# Patient Record
Sex: FEMALE | Race: WHITE | NOT HISPANIC OR LATINO | Employment: OTHER | URBAN - NONMETROPOLITAN AREA
[De-identification: names, ages, dates, MRNs, and addresses within clinical notes are randomized per-mention and may not be internally consistent; named-entity substitution may affect disease eponyms.]

---

## 2024-07-10 ENCOUNTER — TELEPHONE (OUTPATIENT)
Dept: CARDIOLOGY | Facility: CLINIC | Age: 89
End: 2024-07-10

## 2024-07-10 NOTE — TELEPHONE ENCOUNTER
Caller: Roopa Chavez    Relationship to patient: Self    Best call back number: [phone]    Chief complaint: SCHEDULED APPT IS OUTSIDE NEW PATIENT SCHEDULED TIME FRAME    Type of visit: NEW PATIENT    Requested date: ASAP     If rescheduling, when is the original appointment: 9.30.24     Additional notes:PLEASE ADVISE AND CALL.

## 2024-08-28 ENCOUNTER — OFFICE VISIT (OUTPATIENT)
Dept: CARDIOLOGY | Facility: CLINIC | Age: 89
End: 2024-08-28
Payer: MEDICARE

## 2024-08-28 VITALS
OXYGEN SATURATION: 97 % | DIASTOLIC BLOOD PRESSURE: 64 MMHG | HEART RATE: 79 BPM | BODY MASS INDEX: 18.11 KG/M2 | WEIGHT: 102.2 LBS | HEIGHT: 63 IN | SYSTOLIC BLOOD PRESSURE: 130 MMHG

## 2024-08-28 DIAGNOSIS — E78.2 MIXED HYPERLIPIDEMIA: ICD-10-CM

## 2024-08-28 DIAGNOSIS — R09.89 BRUIT OF RIGHT CAROTID ARTERY: Primary | ICD-10-CM

## 2024-08-28 PROBLEM — E55.9 VITAMIN D DEFICIENCY: Status: ACTIVE | Noted: 2021-04-01

## 2024-08-28 PROBLEM — E53.8 FOLIC ACID DEFICIENCY: Status: ACTIVE | Noted: 2024-06-20

## 2024-08-28 PROBLEM — N81.9 PROLAPSE OF FEMALE GENITAL ORGANS: Status: ACTIVE | Noted: 2020-02-04

## 2024-08-28 PROBLEM — E78.5 HYPERLIPIDEMIA: Status: ACTIVE | Noted: 2019-04-18

## 2024-08-28 PROBLEM — R06.09 DYSPNEA ON EXERTION: Status: ACTIVE | Noted: 2024-06-20

## 2024-08-28 PROBLEM — I10 ESSENTIAL HYPERTENSION: Status: ACTIVE | Noted: 2023-02-28

## 2024-08-28 PROCEDURE — 99204 OFFICE O/P NEW MOD 45 MIN: CPT | Performed by: INTERNAL MEDICINE

## 2024-08-28 RX ORDER — FLUTICASONE PROPIONATE 50 MCG
2 SPRAY, SUSPENSION (ML) NASAL DAILY
COMMUNITY

## 2024-08-28 RX ORDER — FEXOFENADINE HCL 180 MG/1
180 TABLET ORAL DAILY
COMMUNITY

## 2024-08-28 RX ORDER — LEVALBUTEROL TARTRATE 45 UG/1
AEROSOL, METERED ORAL
COMMUNITY

## 2024-08-28 RX ORDER — CONJUGATED ESTROGENS 0.62 MG/G
CREAM VAGINAL
COMMUNITY

## 2024-08-28 RX ORDER — AMLODIPINE BESYLATE 5 MG/1
7.5 TABLET ORAL DAILY
COMMUNITY

## 2024-08-28 RX ORDER — DIPHENOXYLATE HYDROCHLORIDE AND ATROPINE SULFATE 2.5; .025 MG/1; MG/1
1 TABLET ORAL DAILY
COMMUNITY

## 2024-08-28 NOTE — PROGRESS NOTES
"Subjective   Roopa Chavez is a 89 y.o. female     Chief Complaint   Patient presents with    Establish Care     Here for eval.     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 Ear and falls approx. 30 yrs. Ago  Prolapsed bladder    Denies Rheumatic / Scarlet fever    Specialty Problems    None        HPI:  Ms. Roopa Chavez is a 89-year-old patient of Dr. Shannon Ivan seen today for evaluation of labile hypertension.    Earlier this year Ms. Chavez had an episode where she \"just did not feel well\".  She checked her blood pressure and systolic blood pressure was in the 190s.  She checked her blood pressure the following morning and systolic pressure was in the range of 110.  He had no chest pain, palpitations, or shortness of breath during that episode.  She denies chest pain, orthopnea, PND, or lower extremity edema.  She senses no palpitations.  She has mild imbalance but no dizziness, presyncope or syncope.    Ms. Chavez describes episodic shortness of breath but relates this to her known reactive airways disease.  She also describes a minimal decline in functional capacity after a fall in May of this year which resulted in hip fracture.  She did not require surgery.  When she uses her cane she has not had any falls.  She describes no symptoms of arterial embolic events or peripheral arterial disease.  Blood pressures are generally well-controlled.                    PRIOR MEDICATIONS    Current Outpatient Medications on File Prior to Visit   Medication Sig Dispense Refill    amLODIPine (NORVASC) 5 MG tablet Take 1.5 tablets by mouth Daily.      fluticasone (FLONASE) 50 MCG/ACT nasal spray 2 sprays by Each Nare route Daily.      multivitamin (MULTIVITAMIN PO) Take 1 tablet by mouth Daily.      Premarin 0.625 MG/GM vaginal cream   Insert 0.5 applicatorsful every day " by vaginal route.      Probiotic Product (PROBIOTIC PO) Daily.      Xopenex HFA 45 MCG/ACT inhaler   INHALE 1 PUFF BY MOUTH EVERY 4 HOURS AS NEEDED FOR SHORTNESS OF BREATH       No current facility-administered medications on file prior to visit.       ALLERGIES:    Aspirin, Bee venom, Cefprozil, Penicillin g, and Sulfa antibiotics    PAST MEDICAL HISTORY:    Past Medical History:   Diagnosis Date    Asthma     Female bladder prolapse     Hyperlipidemia     Hypertension     Hypothyroidism        SURGICAL HISTORY:    Past Surgical History:   Procedure Laterality Date    CATARACT EXTRACTION, BILATERAL      HIATAL HERNIA REPAIR      LEFT OOPHORECTOMY      NASAL POLYP SURGERY         SOCIAL HISTORY:    Social History     Socioeconomic History    Marital status:    Tobacco Use    Smoking status: Never    Smokeless tobacco: Never   Substance and Sexual Activity    Alcohol use: Never    Drug use: Never       FAMILY HISTORY:    Family History   Problem Relation Age of Onset    Tuberculosis Mother     Emphysema Father        Review of Systems   Constitutional:  Positive for fatigue. Negative for chills, diaphoresis, fever and unexpected weight change.   HENT:  Positive for hearing loss.    Eyes:  Positive for visual disturbance (glasses prn).   Respiratory:  Positive for shortness of breath (r/t asthma).         Denies orthopnea/PND   Cardiovascular: Negative.    Gastrointestinal:  Negative for blood in stool (denies melena,hemoptysis), constipation and diarrhea.   Endocrine: Positive for cold intolerance.   Genitourinary: Negative.  Negative for hematuria.   Musculoskeletal:  Positive for gait problem (ambulates with cane. Also has a walker).        Denies leg cramps with ambulation, but can have at night   Skin: Negative.    Allergic/Immunologic: Positive for environmental allergies.   Neurological:  Negative for syncope.        Recent fall with Hx falls. Imbalance r/t HX acoustic neuroma removal.    Hematological:   "Bruises/bleeds easily.   Psychiatric/Behavioral: Negative.         VISIT VITALS:  Vitals:    08/28/24 1029   BP: 130/64   BP Location: Left arm   Patient Position: Sitting   Pulse: 79   SpO2: 97%   Weight: 46.4 kg (102 lb 3.2 oz)   Height: 160 cm (63\")      /64 (BP Location: Left arm, Patient Position: Sitting)   Pulse 79   Ht 160 cm (63\")   Wt 46.4 kg (102 lb 3.2 oz)   SpO2 97%   BMI 18.10 kg/m²     RECENT LABS:    Objective       Physical Exam  Vitals and nursing note reviewed.   Constitutional:       General: She is not in acute distress.     Appearance: She is well-developed.   HENT:      Head: Normocephalic and atraumatic.   Eyes:      Conjunctiva/sclera: Conjunctivae normal.      Pupils: Pupils are equal, round, and reactive to light.      Comments: Ptosis left eye  No lid lag  Extra ocular motions intact     Neck:      Vascular: Carotid bruit (soft right bruit) present. No hepatojugular reflux or JVD.      Trachea: No tracheal deviation.      Comments: Nl. Carotid upstrokes  Cardiovascular:      Rate and Rhythm: Normal rate and regular rhythm.      Pulses:           Radial pulses are 2+ on the right side and 2+ on the left side.      Heart sounds: S1 normal and S2 normal. No murmur heard.     No friction rub. Gallop present. S4 sounds present. No S3 sounds.   Pulmonary:      Effort: Pulmonary effort is normal.      Breath sounds: Normal breath sounds. No wheezing, rhonchi or rales.      Comments: Nl. Expir. Phase  Nl. Breath sound intensity  Abdominal:      General: Bowel sounds are normal. There is no distension or abdominal bruit.      Palpations: Abdomen is soft. There is no mass.      Tenderness: There is no abdominal tenderness. There is no guarding or rebound.      Comments: No organomegaly   Musculoskeletal:         General: No tenderness or deformity. Normal range of motion.      Cervical back: Normal range of motion and neck supple.      Right lower leg: Edema present.      Left lower leg: " Edema present.      Comments: BLE, trace edema, excellent pedal pulses     Skin:     General: Skin is warm and dry.      Coloration: Skin is not pale.      Findings: No erythema or rash.   Neurological:      Mental Status: She is alert and oriented to person, place, and time.   Psychiatric:         Behavior: Behavior normal.         Thought Content: Thought content normal.         Judgment: Judgment normal.         Procedures      Assessment & Plan   #1.  Mild imbalance.  Ms. Chavez does not describe symptoms of dizziness, presyncope, or altered mental status in association with any of her falls.  She does not palpitate.  I do not think that evaluation for cardiac cause is indicated at this time.    2.  Labile hypertension.  The patient describes her blood pressures have been well-controlled since that episode.  I would continue to monitor and not perform further evaluation unless symptoms recur or blood pressure becomes refractory.    3.  Peripheral arterial disease.  The patient has a right neck bruit.  We will perform carotid duplex study to ensure no high grade stenosis.    4.  Asthma.    5.  Ms. Chavez will follow with Dr. Ivan as instructed we will plan on seeing her in follow-up on appearing basis for symptoms or test results as discussed.  No diagnosis found.    No follow-ups on file.         Roopa Chavez  reports that she has never smoked. She has never used smokeless tobacco. I have educated her on the risk of diseases from using tobacco products such as cancer, COPD, and heart disease.     Advance Care Planning   ACP discussion was held with the patient during this visit. Living will received today and will bring in POA papers.               BMI is below normal parameters (malnutrition). Recommendations: pcp addressing               Electronically signed by:    Scribed for Robin Mota MD by April Rees LPN on August 28, 2024  at 10:37 EDT    IRobin MD personally performed the  services described in this documentation as scribed by the above named individual in my presence, and it is both accurate and complete. August 28, 2024 10:37 EDT      Dictated Utilizing Dragon Dictation: Part of this note may be an electronic transcription/translation of spoken language to printed text using the Dragon Dictation System.

## 2024-08-29 ENCOUNTER — TELEPHONE (OUTPATIENT)
Dept: CARDIOLOGY | Facility: CLINIC | Age: 89
End: 2024-08-29
Payer: MEDICARE

## 2024-08-29 NOTE — TELEPHONE ENCOUNTER
DAUGHTER, ERICA WAS JUST WANTING TO NOTIFY THE OFFICE OF +COVID SINCE SHE WAS JUST SEEN YEST. PH,LPN

## 2024-08-29 NOTE — TELEPHONE ENCOUNTER
Caller: ERICA HUMMEL    Relationship: Emergency Contact    Best call back number: [phone]    What is the best time to reach you: ANY    What was the call regarding: ADVISING THE PATIENT IS COVID POSITIVE. SHE WANTS TO SPEAK WITH A CLINICAL PERSON TO ADVISE THEY WERE JUST IN THE OFFICE WITH BOTH A NURSE AND DR. VELASQUEZ.     Is it okay if the provider responds through MyChart: NO

## 2024-10-17 ENCOUNTER — HOSPITAL ENCOUNTER (OUTPATIENT)
Dept: CARDIOLOGY | Facility: HOSPITAL | Age: 89
Discharge: HOME | End: 2024-10-17
Payer: MEDICARE

## 2024-10-17 ENCOUNTER — LAB (OUTPATIENT)
Dept: LAB | Facility: HOSPITAL | Age: 89
End: 2024-10-17
Payer: MEDICARE

## 2024-10-17 DIAGNOSIS — R09.89 BRUIT OF RIGHT CAROTID ARTERY: ICD-10-CM

## 2024-10-17 DIAGNOSIS — E78.2 MIXED HYPERLIPIDEMIA: ICD-10-CM

## 2024-10-17 LAB
ALBUMIN SERPL-MCNC: 4.1 G/DL (ref 3.5–5.2)
ALP SERPL-CCNC: 77 U/L (ref 39–117)
ALT SERPL W P-5'-P-CCNC: 16 U/L (ref 1–33)
AST SERPL-CCNC: 24 U/L (ref 1–32)
BILIRUB CONJ SERPL-MCNC: <0.2 MG/DL (ref 0–0.3)
BILIRUB INDIRECT SERPL-MCNC: NORMAL MG/DL
BILIRUB SERPL-MCNC: 0.4 MG/DL (ref 0–1.2)
CHOLEST SERPL-MCNC: 204 MG/DL (ref 0–200)
HDLC SERPL-MCNC: 92 MG/DL (ref 40–60)
LDLC SERPL CALC-MCNC: 99 MG/DL (ref 0–100)
LDLC/HDLC SERPL: 1.05 {RATIO}
PROT SERPL-MCNC: 6.8 G/DL (ref 6–8.5)
TRIGL SERPL-MCNC: 76 MG/DL (ref 0–150)
VLDLC SERPL-MCNC: 13 MG/DL (ref 5–40)

## 2024-10-17 PROCEDURE — 36415 COLL VENOUS BLD VENIPUNCTURE: CPT

## 2024-10-17 PROCEDURE — 80076 HEPATIC FUNCTION PANEL: CPT

## 2024-10-17 PROCEDURE — 93880 EXTRACRANIAL BILAT STUDY: CPT

## 2024-10-17 PROCEDURE — 80061 LIPID PANEL: CPT

## 2024-10-20 LAB
BH CV XLRA MEAS LEFT CAROTID BULB EDV: -7.4 CM/SEC
BH CV XLRA MEAS LEFT CAROTID BULB PSV: -45.9 CM/SEC
BH CV XLRA MEAS LEFT DIST CCA EDV: 9 CM/SEC
BH CV XLRA MEAS LEFT DIST CCA PSV: 49.8 CM/SEC
BH CV XLRA MEAS LEFT DIST ICA EDV: -12.8 CM/SEC
BH CV XLRA MEAS LEFT DIST ICA PSV: -61.4 CM/SEC
BH CV XLRA MEAS LEFT ICA/CCA RATIO: 2.1
BH CV XLRA MEAS LEFT MID ICA EDV: -28.1 CM/SEC
BH CV XLRA MEAS LEFT MID ICA PSV: -107 CM/SEC
BH CV XLRA MEAS LEFT PROX CCA EDV: 13.7 CM/SEC
BH CV XLRA MEAS LEFT PROX CCA PSV: 69.7 CM/SEC
BH CV XLRA MEAS LEFT PROX ECA PSV: -59.2 CM/SEC
BH CV XLRA MEAS LEFT PROX ICA EDV: 14.9 CM/SEC
BH CV XLRA MEAS LEFT PROX ICA PSV: 54.7 CM/SEC
BH CV XLRA MEAS LEFT VERTEBRAL A EDV: 13.3 CM/SEC
BH CV XLRA MEAS LEFT VERTEBRAL A PSV: 50.6 CM/SEC
BH CV XLRA MEAS RIGHT CAROTID BULB EDV: -7.1 CM/SEC
BH CV XLRA MEAS RIGHT CAROTID BULB PSV: -45.1 CM/SEC
BH CV XLRA MEAS RIGHT DIST CCA EDV: 8.6 CM/SEC
BH CV XLRA MEAS RIGHT DIST CCA PSV: 52.5 CM/SEC
BH CV XLRA MEAS RIGHT DIST ICA EDV: -15.1 CM/SEC
BH CV XLRA MEAS RIGHT DIST ICA PSV: -56.1 CM/SEC
BH CV XLRA MEAS RIGHT ICA/CCA RATIO: 1.6
BH CV XLRA MEAS RIGHT MID ICA EDV: -23 CM/SEC
BH CV XLRA MEAS RIGHT MID ICA PSV: -82.3 CM/SEC
BH CV XLRA MEAS RIGHT PROX CCA EDV: 6.7 CM/SEC
BH CV XLRA MEAS RIGHT PROX CCA PSV: 58.8 CM/SEC
BH CV XLRA MEAS RIGHT PROX ECA PSV: -72.1 CM/SEC
BH CV XLRA MEAS RIGHT PROX ICA EDV: -13.7 CM/SEC
BH CV XLRA MEAS RIGHT PROX ICA PSV: -56.4 CM/SEC
BH CV XLRA MEAS RIGHT VERTEBRAL A EDV: -14.8 CM/SEC
BH CV XLRA MEAS RIGHT VERTEBRAL A PSV: -56 CM/SEC

## 2024-10-22 ENCOUNTER — TELEPHONE (OUTPATIENT)
Dept: CARDIOLOGY | Facility: CLINIC | Age: 89
End: 2024-10-22
Payer: MEDICARE

## 2024-10-22 NOTE — TELEPHONE ENCOUNTER
US CAROTID  Pt notified of no acute findings. Provider will discuss results at f/u. Pt reminded of appt date and time.  ----- Message from Robin Mota sent at 10/22/2024 12:35 PM EDT -----  Data duplex study was unremarkable follow-up as needed  ----- Message -----  From: Robin Mota MD  Sent: 10/20/2024   8:07 PM EDT  To: Robin Mota MD